# Patient Record
Sex: MALE | Race: AMERICAN INDIAN OR ALASKA NATIVE
[De-identification: names, ages, dates, MRNs, and addresses within clinical notes are randomized per-mention and may not be internally consistent; named-entity substitution may affect disease eponyms.]

---

## 2017-08-22 ENCOUNTER — HOSPITAL ENCOUNTER (EMERGENCY)
Dept: HOSPITAL 5 - ED | Age: 42
LOS: 1 days | Discharge: LEFT BEFORE DISCHARGE | End: 2017-08-23
Payer: SELF-PAY

## 2017-08-22 VITALS — DIASTOLIC BLOOD PRESSURE: 108 MMHG | SYSTOLIC BLOOD PRESSURE: 156 MMHG

## 2017-08-22 DIAGNOSIS — K08.89: Primary | ICD-10-CM

## 2017-08-22 DIAGNOSIS — Z53.21: ICD-10-CM

## 2019-01-21 ENCOUNTER — HOSPITAL ENCOUNTER (EMERGENCY)
Dept: HOSPITAL 5 - ED | Age: 44
Discharge: HOME | End: 2019-01-21
Payer: COMMERCIAL

## 2019-01-21 VITALS — DIASTOLIC BLOOD PRESSURE: 108 MMHG | SYSTOLIC BLOOD PRESSURE: 155 MMHG

## 2019-01-21 DIAGNOSIS — K62.5: Primary | ICD-10-CM

## 2019-01-21 DIAGNOSIS — F17.200: ICD-10-CM

## 2019-01-21 PROCEDURE — 99282 EMERGENCY DEPT VISIT SF MDM: CPT

## 2019-01-21 NOTE — EMERGENCY DEPARTMENT REPORT
ED GI Bleed HPI





- General


Chief complaint: GI Bleed


Stated complaint: BLOODY STOOL


Time Seen by Provider: 19 10:19


Source: patient


Mode of arrival: Ambulatory


Limitations: No Limitations





- History of Present Illness


Initial comments: 





Mr. Nicholson is a very pleasant 42 yo male with a history of hypertension and IBS 

who presents with bright red blood per rectum.  He has had diarrhea for one 

week.  Now bright red blood with brown stool.  Previous history of rectal 

bleeding years ago.  Denies any abdominal pain.  Denies fever.  He denies 

lightheadedness.  Otherwise he's been in good health.  He requests referral to 

primary care physician.


MD complaint: blood on toilet paper, blood streaked stool


-: days(s) (2)


Consistency: constant


Worsens with: bowel movement


Associated Symptoms: denies other symptoms





- Related Data


                                  Previous Rx's











 Medication  Instructions  Recorded  Last Taken  Type


 


traMADol [Ultram 50 MG tab] 50 mg PO Q6HR PRN #14 tablet 08/18/15 Unknown Rx











                                    Allergies











Allergy/AdvReac Type Severity Reaction Status Date / Time


 


No Known Allergies Allergy   Unverified 08/18/15 19:22














ED Review of Systems


ROS: 


Stated complaint: BLOODY STOOL


Other details as noted in HPI





Comment: All other systems reviewed and negative


Constitutional: denies: fever, malaise


Respiratory: denies: cough


Cardiovascular: denies: chest pain





ED Past Medical Hx





- Past Medical History


Previous Medical History?: Yes


Additional medical history: IBS





- Surgical History


Past Surgical History?: No





- Family History


Family history: diabetes, vascular disease, other (Brother recently  history

of end-stage renal disease, diabetic kidney disease)





- Social History


Smoking Status: Current Every Day Smoker


Substance Use Type: Alcohol





- Medications


Home Medications: 


                                Home Medications











 Medication  Instructions  Recorded  Confirmed  Last Taken  Type


 


traMADol [Ultram 50 MG tab] 50 mg PO Q6HR PRN #14 tablet 08/18/15  Unknown Rx














ED Physical Exam





- General


Limitations: No Limitations


General appearance: alert, in no apparent distress





- Head


Head exam: Present: atraumatic, normocephalic





- Eye


Eye exam: Present: normal appearance.  Absent: scleral icterus, conjunctival i

njection





- ENT


ENT exam: Present: mucous membranes moist





- Neck


Neck exam: Present: normal inspection, full ROM





- Respiratory


Respiratory exam: Present: normal lung sounds bilaterally.  Absent: respiratory 

distress, wheezes, rales, rhonchi





- Cardiovascular


Cardiovascular Exam: Present: regular rate, normal rhythm, normal heart sounds. 

Absent: systolic murmur, diastolic murmur, rubs, gallop





- GI/Abdominal


GI/Abdominal exam: Present: soft, normal bowel sounds.  Absent: distended, 

tenderness, guarding, rebound





- Rectal


Rectal exam: Present: deferred





- Extremities Exam


Extremities exam: Present: normal inspection





- Back Exam


Back exam: Present: normal inspection





- Neurological Exam


Neurological exam: Present: alert, oriented X3





- Psychiatric


Psychiatric exam: Present: normal affect, normal mood





- Skin


Skin exam: Present: warm, dry, intact, normal color.  Absent: rash





ED Course





                                   Vital Signs











  19





  10:07 10:19


 


Temperature 97.3 F L 


 


Pulse Rate 78 


 


Respiratory 18 16





Rate  


 


Blood Pressure 155/108 


 


O2 Sat by Pulse 99 





Oximetry  














ED Medical Decision Making





- Medical Decision Making





Rectal bleeding possibly due to anal fissure versus rectal hemorrhoids.  Patient

given reassurance.  No evidence of anemia on physical exam.  Provided referral 

to outpatient medical physician.


Critical care attestation.: 


If time is entered above; I have spent that time in minutes in the direct care 

of this critically ill patient, excluding procedure time.








ED Disposition


Clinical Impression: 


 Rectal bleeding





Disposition: DC-01 TO HOME OR SELFCARE


Is pt being admited?: No


Does the pt Need Aspirin: No


Condition: Stable


Instructions:  Rectal Bleeding (ED)


Referrals: 


TERRA STEPHENSON MD [Staff Physician] - 3-5 Days


Forms:  Accompanied Note, Work/School Release Form(ED)

## 2019-09-23 ENCOUNTER — HOSPITAL ENCOUNTER (EMERGENCY)
Dept: HOSPITAL 5 - ED | Age: 44
Discharge: HOME | End: 2019-09-23
Payer: COMMERCIAL

## 2019-09-23 VITALS — SYSTOLIC BLOOD PRESSURE: 149 MMHG | DIASTOLIC BLOOD PRESSURE: 94 MMHG

## 2019-09-23 DIAGNOSIS — F12.10: ICD-10-CM

## 2019-09-23 DIAGNOSIS — I10: ICD-10-CM

## 2019-09-23 DIAGNOSIS — R06.02: ICD-10-CM

## 2019-09-23 DIAGNOSIS — F17.200: ICD-10-CM

## 2019-09-23 DIAGNOSIS — K21.9: ICD-10-CM

## 2019-09-23 DIAGNOSIS — R07.81: Primary | ICD-10-CM

## 2019-09-23 LAB
ALBUMIN SERPL-MCNC: 4.4 G/DL (ref 3.9–5)
ALT SERPL-CCNC: 19 UNITS/L (ref 7–56)
BASOPHILS # (AUTO): 0.1 K/MM3 (ref 0–0.1)
BASOPHILS NFR BLD AUTO: 1.1 % (ref 0–1.8)
BUN SERPL-MCNC: 14 MG/DL (ref 9–20)
BUN/CREAT SERPL: 16 %
CALCIUM SERPL-MCNC: 9.3 MG/DL (ref 8.4–10.2)
EOSINOPHIL # BLD AUTO: 0 K/MM3 (ref 0–0.4)
EOSINOPHIL NFR BLD AUTO: 0.1 % (ref 0–4.3)
HCT VFR BLD CALC: 42.8 % (ref 35.5–45.6)
HEMOLYSIS INDEX: 65
HGB BLD-MCNC: 14.6 GM/DL (ref 11.8–15.2)
LYMPHOCYTES # BLD AUTO: 1.7 K/MM3 (ref 1.2–5.4)
LYMPHOCYTES NFR BLD AUTO: 17.6 % (ref 13.4–35)
MCHC RBC AUTO-ENTMCNC: 34 % (ref 32–34)
MCV RBC AUTO: 96 FL (ref 84–94)
MONOCYTES # (AUTO): 1.3 K/MM3 (ref 0–0.8)
MONOCYTES % (AUTO): 13.2 % (ref 0–7.3)
PLATELET # BLD: 234 K/MM3 (ref 140–440)
RBC # BLD AUTO: 4.48 M/MM3 (ref 3.65–5.03)

## 2019-09-23 PROCEDURE — 94640 AIRWAY INHALATION TREATMENT: CPT

## 2019-09-23 PROCEDURE — 36415 COLL VENOUS BLD VENIPUNCTURE: CPT

## 2019-09-23 PROCEDURE — 93005 ELECTROCARDIOGRAM TRACING: CPT

## 2019-09-23 PROCEDURE — 80053 COMPREHEN METABOLIC PANEL: CPT

## 2019-09-23 PROCEDURE — 84484 ASSAY OF TROPONIN QUANT: CPT

## 2019-09-23 PROCEDURE — 74022 RADEX COMPL AQT ABD SERIES: CPT

## 2019-09-23 PROCEDURE — 85379 FIBRIN DEGRADATION QUANT: CPT

## 2019-09-23 PROCEDURE — 83690 ASSAY OF LIPASE: CPT

## 2019-09-23 PROCEDURE — 93010 ELECTROCARDIOGRAM REPORT: CPT

## 2019-09-23 PROCEDURE — 85025 COMPLETE CBC W/AUTO DIFF WBC: CPT

## 2019-09-23 NOTE — XRAY REPORT
ABDOMEN 3 VIEW(S)



INDICATION:

Chest pain. Burning sensation in chest. Indigestion.



COMPARISON: 

No relevant prior imaging study available.



FINDINGS:

Bowel gas pattern: No significant abnormality.

Free air: None seen.

Stones: None seen.

Chest: No acute findings.



Additional Findings: No additional significant findings.



IMPRESSION:

No acute abnormality of the abdomen.



Signer Name: Germán Hutton MD 

Signed: 9/23/2019 3:00 PM

 Workstation Name: [id number]

## 2019-09-23 NOTE — EMERGENCY DEPARTMENT REPORT
ED Chest Pain HPI





- General


Chief Complaint: Chest Pain


Stated Complaint: CHEST PAIN


Time Seen by Provider: 09/23/19 14:17


Source: patient


Mode of arrival: Ambulatory


Limitations: No Limitations





- History of Present Illness


Initial Comments: 


44-year-old -American male presents to the emergency department with a 

complaint of a 1 day history of some chest discomfort and shortness of breath.  

The patient woke up yesterday with the feeling that he had some gas trapped in 

his chest.  He drank a soda, was able to belch, and felt like chest tightness 

and/or gas pain resolved.  However he continues to have some shortness of 

breath.  The patient has been dealing with some hypertension and dizziness 

recently.  He was changed from lisinopril to amlodipine about 10 days ago and 

says he has been taking compliantly.  He is a tobacco smoker.  The patient 

recently traveled to and from [facility].  He denies any lower 

extremity swelling.  Denies any illicit drug use.  No family history of early 

cardiac disease or events.








- Related Data


                                  Previous Rx's











 Medication  Instructions  Recorded  Last Taken  Type


 


traMADol [Ultram 50 MG tab] 50 mg PO Q6HR PRN #14 tablet 08/18/15 Unknown Rx


 


ALBUTEROL Inhaler (OR & NICU) 2 puff IH QID PRN #1 inhalation 09/23/19 Unknown 

Rx





[ProAir HFA Inhaler]    











                                    Allergies











Allergy/AdvReac Type Severity Reaction Status Date / Time


 


No Known Allergies Allergy   Unverified 08/18/15 19:22














Heart Score





- HEART Score


History: Slightly suspicious


EKG: Normal


Age: 45-65


Risk factors: 1-2 risk factors


Troponin: < normal limit


HEART Score: 2





- Critical Actions


Critical Actions: 0-3 pts:0.9-1.7%risk of adverse cardiac event.Candidate for 

discharge





ED Review of Systems


ROS: 


Stated complaint: CHEST PAIN


Other details as noted in HPI





Comment: All other systems reviewed and negative


Constitutional: denies: chills, fever


Eyes: denies: eye pain, vision change


ENT: denies: ear pain, throat pain


Respiratory: shortness of breath.  denies: cough


Cardiovascular: chest pain (resolved).  denies: edema


Gastrointestinal: denies: abdominal pain, vomiting


Genitourinary: denies: dysuria, discharge


Musculoskeletal: denies: back pain, arthralgia


Skin: denies: rash, lesions


Neurological: denies: headache, weakness





ED Past Medical Hx





- Past Medical History


Previous Medical History?: Yes


Hx GERD: Yes


Additional medical history: IBS





- Surgical History


Past Surgical History?: No





- Social History


Smoking Status: Current Every Day Smoker


Substance Use Type: Alcohol, Marijuana





- Medications


Home Medications: 


                                Home Medications











 Medication  Instructions  Recorded  Confirmed  Last Taken  Type


 


traMADol [Ultram 50 MG tab] 50 mg PO Q6HR PRN #14 tablet 08/18/15  Unknown Rx


 


ALBUTEROL Inhaler (OR & NICU) 2 puff IH QID PRN #1 inhalation 09/23/19  Unknown 

Rx





[ProAir HFA Inhaler]     














ED Physical Exam





- General


Limitations: No Limitations





- Other


Other exam information: 


GENERAL: The patient is well-developed well-nourished.


HENT: Normocephalic.  Atraumatic.    Patient has moist mucous membranes.


EYES: Extraocular motions are intact. 


NECK: Supple. Trachea is midline.


CHEST/LUNGS: Clear to auscultation.  There is no respiratory distress noted.


HEART/CARDIOVASCULAR: Regular.  There is no tachycardia.  There is no murmur.


ABDOMEN: Abdomen is soft, nontender.  Patient has normal bowel sounds.  There is

no abdominal distention.


SKIN: Skin is warm and dry.


NEURO: The patient is awake, alert, and oriented.  The patient is cooperative.  

The patient has no focal neurologic deficits.  Normal speech.


MUSCULOSKELETAL: There is no tenderness or deformity.  There is no evidence of 

acute injury.








ED Course


                                   Vital Signs











  09/23/19 09/23/19





  14:18 17:16


 


Temperature 98.4 F 


 


Pulse Rate 103 H 90


 


Respiratory 18 17





Rate  


 


Blood Pressure 155/108 


 


Blood Pressure  149/94





[Left]  


 


O2 Sat by Pulse 100 100





Oximetry  














LEAH score





- Leah Score


Age > 65: (0) No


Aspirin use within the Past 7 Days: (0) No


3 or more CAD Risk Factors: (0) No


2 or more Angina events in past 24 hrs: (1) Yes


Known CAD with more than 50% Stenosis: (0) No


Elevated Cardiac Markers: (0) No


ST Deviation Greater than 0.5mm: (0) No


LEAH Score: 1





ED Medical Decision Making





- Lab Data


Result diagrams: 


                                 09/23/19 15:00





                                 09/23/19 15:00





- EKG Data


-: EKG Interpreted by Me


EKG shows normal: sinus rhythm, axis, intervals, QRS complexes, ST-T waves


Rate: normal





- EKG Data


When compared to previous EKG there are: previous EKG unavailable


Interpretation: normal EKG





- Radiology Data


Radiology results: image reviewed


interpreted by me: 





Chest x-ray does not show any acute process.  There are no pleural effusions, 

obvious pneumonia and there is no pneumothorax.





Abdominal x-ray shows nonspecific nonobstructive bowel gas





- Medical Decision Making


Patient presents to the emergency department with a one-day history of some gas-

like chest discomfort and some shortness of breath.  The chest discomfort appear

s to have resolved but he still has some shortness of breath.  Chest x-ray does 

not show any pneumonia, pleural effusions, pneumothorax, focal consolidation, or

 any other acute process.  Labs were unremarkable including negative troponin 

and negative d-dimer.  We discussed smoking cessation and dietary/lifestyle 

changes regarding his hypertension.  The patient has a heart score of 2 and a 

low LEAH score.  He appears safe for discharge home at this time but his 

information has been sent over to [facility] cardiology who will be 

contacting him shortly for a close outpatient follow-up regarding his previous 

chest pain.  The patient was instructed to return to the emergency department 

with any return of his chest pain, worsening of his symptoms, or with any acute 

distress.








- Differential Diagnosis


MI, PE, GERD, gas, costochondritis


Critical Care Time: No


Critical care attestation.: 


If time is entered above; I have spent that time in minutes in the direct care 

of this critically ill patient, excluding procedure time.








ED Disposition


Clinical Impression: 


 Shortness of breath





Chest pain


Qualifiers:


 Chest pain type: chest pain on breathing Qualified Code(s): R07.1 - Chest pain 

on breathing; R07.81 - Pleurodynia





Hypertension


Qualifiers:


 Hypertension type: essential hypertension Qualified Code(s): I10 - Essential 

(primary) hypertension





Disposition: DC-01 TO HOME OR SELFCARE


Is pt being admited?: No


Condition: Stable


Instructions:  Chest Pain (ED), Dyspnea (ED), Hypertension (ED)


Additional Instructions: 


Please quit smoking cigarettes.





Try and stay away from foods that are high in salt and caffeinated products to 

help with her blood pressure.  Keep a blood pressure log.  Take your blood 

pressure medications as previously prescribed.





You will be contacted by someone at [facility] for a close 

outpatient follow-up appointment.





Return to the emergency Department with any worsening of your symptoms, return 

of your chest pain, or with any acute distress.


Prescriptions: 


ALBUTEROL Inhaler (OR & NICU) [ProAir HFA Inhaler] 2 puff IH QID PRN #1 

inhalation


 PRN Reason: Shortness Of Breath


Referrals: 


[facility] [Provider Group] - 2-3 Days


TERRA STEPHENSON MD [Staff Physician] - 2-3 Days


[facility] [Outside] - 2-3 Days


Time of Disposition: 16:47

## 2019-09-23 NOTE — EVENT NOTE
ED Screening Note


ED Screening Note: 








pt states that he had the hiccups for a day and a half 


states he was having indigestion 


states he drank a coke


states he has chest tightness


no SOB


no vomiting 


no radiation of the pain 





PMHx IBS, HTN


no allergies to meds





This initial assessment/diagnostic orders/clinical plan/treatment(s) is/are 

subject to change based on patients health status, clinical progression and re-

assessment by fellow clinical providers in the ED. Further treatment and workup 

at subsequent clinical providers discretion. Patient/guardian urged not to elope

from the ED as their condition may be serious if not clinically assessed and 

managed. 





Initial orders include: labs, EKG, CXR

## 2021-08-11 NOTE — EMERGENCY DEPARTMENT REPORT
HPI





- General


Chief Complaint: Upper Respiratory Infection


Time Seen by Provider: 08/11/21 07:02





- HPI


HPI: 





This is a 45-year-old -American male presents to the emergency department

via EMS from home with complaint of a 2-day history of low back pain with some 

sharp radiation of pain down his legs.  He denies any numbness or paresthesias, 

problems with bowel or bladder, or any other neurological deficits.  He denies 

any fall, injury, or inciting event.  He admits that this is happened to him in 

the past but not usually this intense.  The patient also admits to exposure to 

COVID-19 as he says that both of his roommates recently tested positive.  

Patient says that he has a mild generalized headache, and occasional cough, sore

throat.  He has not taken anything for symptoms prior to presentation today.  He

has a past medical history of IBS.  He is a tobacco smoker but denies any 

illicit drug use.





ED Past Medical Hx





- Past Medical History


Previous Medical History?: Yes


Hx GERD: Yes


Additional medical history: IBS





- Surgical History


Past Surgical History?: No





- Social History


Smoking Status: Current Every Day Smoker


Substance Use Type: Alcohol, Marijuana





- Medications


Home Medications: 


                                Home Medications











 Medication  Instructions  Recorded  Confirmed  Last Taken  Type


 


traMADoL [Ultram 50 MG tab] 50 mg PO Q6HR PRN #14 tablet 08/18/15  Unknown Rx


 


Albuterol Mdi (or & Nicu Only) 2 puff IH QID PRN #1 inhalation 08/11/21  Unknown

Rx





[ProAir HFA Inhaler]     


 


Cyclobenzaprine [Flexeril] 10 mg PO TID PRN #12 tablet 08/11/21  Unknown Rx


 


Ibuprofen [Motrin 800 MG tab] 800 mg PO Q8HR PRN #20 tablet 08/11/21  Unknown Rx














ED Review of Systems


ROS: 


Stated complaint: BACK PAIN


Other details as noted in HPI





Comment: All other systems reviewed and negative


Constitutional: denies: chills, fever


Eyes: denies: eye pain, vision change


ENT: throat pain, congestion.  denies: ear pain


Respiratory: cough.  denies: shortness of breath


Cardiovascular: denies: chest pain, palpitations


Gastrointestinal: denies: abdominal pain, vomiting


Genitourinary: denies: dysuria, discharge


Musculoskeletal: back pain.  denies: joint swelling


Skin: denies: rash, lesions


Neurological: headache.  denies: numbness, paresthesias





Physical Exam





- Physical Exam


Vital Signs: 


                                   Vital Signs











  08/11/21 08/11/21





  04:20 06:19


 


Temperature 100.6 F H 99.0 F


 


Pulse Rate 137 H 116 H


 


Respiratory 20 17





Rate  


 


Blood Pressure 145/103 134/89





[Right]  


 


O2 Sat by Pulse 99 100





Oximetry  











Physical Exam: 





GENERAL: The patient is well-developed well-nourished.


HENT: Normocephalic.  Atraumatic.    Patient has moist mucous membranes.


EYES: Extraocular motions are intact.  


NECK: Supple. Trachea is midline.


CHEST/LUNGS: Clear to auscultation.  There is no respiratory distress noted.


HEART/CARDIOVASCULAR: Regular.  There is mild tachycardia.  There is no murmur.


ABDOMEN: Abdomen is soft, nontender.  Patient has normal bowel sounds.  


SKIN: Skin is warm and dry. 


NEURO: The patient is awake, alert, and oriented.  The patient is cooperative.  

The patient has no focal neurologic deficits.  Normal speech.


MUSCULOSKELETAL: There is no tenderness or deformity.  There is no limitation 

range of motion.  


BACK: No midline thoracic or lumbar tenderness to palpation.  There is bilateral

 lumbar paraspinal tenderness to palpation with associated taut musculature.





ED Course


                                   Vital Signs











  08/11/21 08/11/21





  04:20 06:19


 


Temperature 100.6 F H 99.0 F


 


Pulse Rate 137 H 116 H


 


Respiratory 20 17





Rate  


 


Blood Pressure 145/103 134/89





[Right]  


 


O2 Sat by Pulse 99 100





Oximetry  














ED Medical Decision Making





- Lab Data


Result diagrams: 


                                 08/11/21 04:58





                                 08/11/21 04:58





                                   Lab Results











  08/11/21 08/11/21 Range/Units





  04:58 04:58 


 


WBC  5.1   (4.5-11.0)  K/mm3


 


RBC  5.20 H   (3.65-5.03)  M/mm3


 


Hgb  17.3 H   (11.8-15.2)  gm/dl


 


Hct  50.2 H   (35.5-45.6)  %


 


MCV  97 H   (84-94)  fl


 


MCH  33 H   (28-32)  pg


 


MCHC  34   (32-34)  %


 


RDW  13.2   (13.2-15.2)  %


 


Plt Count  196   (140-440)  K/mm3


 


Sodium   131 L  (137-145)  mmol/L


 


Potassium   4.0  (3.6-5.0)  mmol/L


 


Chloride   92.8 L  ()  mmol/L


 


Carbon Dioxide   20 L  (22-30)  mmol/L


 


Anion Gap   22  mmol/L


 


BUN   17  (9-20)  mg/dL


 


Creatinine   1.3  (0.8-1.3)  mg/dL


 


Estimated GFR   > 60  ml/min


 


BUN/Creatinine Ratio   13  %


 


Glucose   83  ()  mg/dL


 


Calcium   9.5  (8.4-10.2)  mg/dL


 


Total Bilirubin   0.30  (0.1-1.2)  mg/dL


 


AST   47 H  (5-40)  units/L


 


ALT   20  (7-56)  units/L


 


Alkaline Phosphatase   125  ()  units/L


 


Total Protein   7.8  (6.3-8.2)  g/dL


 


Albumin   3.9  (3.9-5)  g/dL


 


Albumin/Globulin Ratio   1.0  %














- EKG Data


-: EKG Interpreted by Me


EKG shows normal: sinus rhythm, axis, intervals, QRS complexes (Q waves to the 

septal leads), ST-T waves


Rate: tachycardia (120 bpm)





- EKG Data


When compared to previous EKG there are: previous EKG unavailable


Interpretation: other (Sinus tachycardia at 120 bpm, normal axis, normal 

intervals, Q waves to the septal leads.  No ST elevation MI.)





- Radiology Data


Radiology results: image reviewed


interpreted by me: 





Chest x-ray does not show any acute process.  There are no pleural effusions, 

obvious pneumonia and there is no pneumothorax.  No widened mediastinum.





- Medical Decision Making





This patient presents to the emergency department with a few days of URI 

symptoms, and a 2-day history of low back pain with some radiation down the 

legs.





Patient was exposed to COVID-19 by both of his roommates.  The patient has some 

mild tachycardia but there is no significant tachypnea, accessory muscle use, 

conversational dyspnea.  He does not appear in any respiratory or acute 

distress.  Chest x-ray does not show any pneumonia, pleural effusions, 

pneumothorax, widened mediastinum, or any other acute process.





Patient has some reproducible lumbar paraspinal tenderness to palpation.  He 

denies any numbness or paresthesias, problems with bowel or bladder, or any 

neurological deficits.  He was given a dose of IV analgesia and an anti-

inflammatory with some improvement of his discomfort.  The patient was seen 

ambulatory in the emergency department and both appears and feels stable.  We 

also use this time to do a walking pulse ox test due to the suspected Covid, but

the patient did not have any oxygen desaturation.





He appears safe for discharge at this time.  We discussed isolation/quarantine 

and outpatient COVID-19 testing.  Once he is over the suspected Covid illness, 

he has been given outpatient referral for a local neurosurgeon to be evaluated 

for his low back pain and what appears to be sciatica or radiculopathy.  He will

return to the emergency department with any worsening of symptoms or with any 

acute distress.


Critical Care Time: No


Critical care attestation.: 


If time is entered above; I have spent that time in minutes in the direct care 

of this critically ill patient, excluding procedure time.








ED Disposition


Clinical Impression: 


 Suspected COVID-19 virus infection





Low back pain


Qualifiers:


 Chronicity: acute Back pain laterality: bilateral Sciatica presence: with 

sciatica 





Sciatica


Qualifiers:


 Laterality: bilateral Qualified Code(s): M54.31 - Sciatica, right side; M54.32 

- Sciatica, left side





Disposition: DC-01 TO HOME OR SELFCARE


Is pt being admited?: No


Condition: Stable


Instructions:  COVID-19, Acute Back Pain, Adult, Sciatica, Prevent the Spread of

COVID-19 if You Are Sick - [facility]


Additional Instructions: 


Given your symptoms, and your exposure to Covid positive roommates, I suspect 

that you also have the COVID-19 infection.  Unfortunately, I am unable to test 

you for COVID-19 at this time as we do not have point-of-care testing available.

 Please isolate/quarantine your self with anybody who is not vaccinated, 

immunocompromised, chronically ill/debilitated, elderly.  I recommend that you 

seek outpatient COVID-19 testing.  This can be done at some primary care 

offices, some urgent cares, and there should be a listing of testing facilities 

through the [place] Department of health.





Regarding your low back pain and what appears to be sciatica, I am giving you 2 

prescriptions.  You will receive a prescription for ibuprofen, an anti-

inflammatory, and Flexeril, a muscle relaxer.  I am also giving you an 

outpatient referral for a local neurosurgeon, Dr. Ross.





You have been prescribed a medication that is sedating and therefore should not 

be taken prior to driving, working, and responsible for children and in no way 

should be mixed with alcohol of any quantity.





Return to the emergency department with any worsening of your symptoms, new or 

concerning symptoms not addressed during this current emergency department 

visit, or with any acute distress.


Prescriptions: 


Cyclobenzaprine [Flexeril] 10 mg PO TID PRN #12 tablet


 PRN Reason: Muscle Spasm


Ibuprofen [Motrin 800 MG tab] 800 mg PO Q8HR PRN #20 tablet


 PRN Reason: Pain , Severe (7-10)


Albuterol Mdi (or & Nicu Only) [ProAir HFA Inhaler] 2 puff IH QID PRN #1 

inhalation


 PRN Reason: Shortness Of Breath


Referrals: 


PRIMARY CARE,MD [Primary Care Provider] - 3-5 Days


MARIXA ROSS II, MD [Staff Physician] - 3-5 Days


Time of Disposition: 10:47

## 2021-08-11 NOTE — XRAY REPORT
CHEST - 1 VIEW



INDICATION:  cough,congestion,fever,loss of taste/smell 



COMPARISON:

9/23/2019



FINDINGS:



SUPPORT DEVICES:  Stable support device positioning.



HEART:  Stable cardiomediastinal silhouette.



LUNGS/PLEURA:  Clear lungs.



ADDITIONAL FINDINGS:  None.



IMPRESSION:  



Unchanged exam.



Signer Name: Eusebio Harris MD 

Signed: 8/11/2021 5:05 AM

Workstation Name: [id number]

## 2021-08-13 NOTE — ELECTROCARDIOGRAPH REPORT
[facility]

                                       

Test Date:    2021               Test Time:    05:33:32

Pat Name:     ARIELLE SINGER              Department:   

Patient ID:   [id number]          Room:          

Gender:       M                        Technician:   EP

:          1975               Requested By: AIRAM SNYDER

Order Number: [id number]              Reading MD:   Gaudencio Ugalde

                                 Measurements

Intervals                              Axis          

Rate:         120                      P:            71

NV:           168                      QRS:          3

QRSD:         94                       T:            13

QT:           324                                    

QTc:          458                                    

                           Interpretive Statements

Sinus tachycardia

LAE, consider biatrial enlargement

nonspecific st-t

No previous ECG available for comparison

Electronically Signed On 2021 9:10:56 EDT by Gaudencio Ugalde